# Patient Record
Sex: MALE | Race: WHITE | NOT HISPANIC OR LATINO | Employment: OTHER | URBAN - METROPOLITAN AREA
[De-identification: names, ages, dates, MRNs, and addresses within clinical notes are randomized per-mention and may not be internally consistent; named-entity substitution may affect disease eponyms.]

---

## 2023-01-01 ENCOUNTER — NURSING HOME VISIT (OUTPATIENT)
Dept: POST ACUTE CARE | Facility: EXTERNAL LOCATION | Age: 88
End: 2023-01-01
Payer: MEDICARE

## 2023-01-01 DIAGNOSIS — R33.8 ACUTE URINARY RETENTION: ICD-10-CM

## 2023-01-01 DIAGNOSIS — R27.0 ATAXIA: ICD-10-CM

## 2023-01-01 DIAGNOSIS — I95.89 OTHER SPECIFIED HYPOTENSION: Primary | ICD-10-CM

## 2023-01-01 DIAGNOSIS — I95.89 OTHER SPECIFIED HYPOTENSION: ICD-10-CM

## 2023-01-01 DIAGNOSIS — R31.9 URINARY TRACT INFECTION WITH HEMATURIA, SITE UNSPECIFIED: Primary | ICD-10-CM

## 2023-01-01 DIAGNOSIS — F03.B0 MODERATE DEMENTIA, UNSPECIFIED DEMENTIA TYPE, UNSPECIFIED WHETHER BEHAVIORAL, PSYCHOTIC, OR MOOD DISTURBANCE OR ANXIETY (MULTI): ICD-10-CM

## 2023-01-01 DIAGNOSIS — E11.40 TYPE 2 DIABETES MELLITUS WITH DIABETIC NEUROPATHY, UNSPECIFIED WHETHER LONG TERM INSULIN USE (MULTI): Primary | ICD-10-CM

## 2023-01-01 DIAGNOSIS — M81.0 OSTEOPOROSIS, UNSPECIFIED OSTEOPOROSIS TYPE, UNSPECIFIED PATHOLOGICAL FRACTURE PRESENCE: ICD-10-CM

## 2023-01-01 DIAGNOSIS — G93.41 METABOLIC ENCEPHALOPATHY: ICD-10-CM

## 2023-01-01 DIAGNOSIS — I95.9 HYPOTENSION, UNSPECIFIED HYPOTENSION TYPE: ICD-10-CM

## 2023-01-01 DIAGNOSIS — K40.30 INCARCERATED INGUINAL HERNIA: ICD-10-CM

## 2023-01-01 DIAGNOSIS — Z00.00 ENCOUNTER FOR MEDICARE ANNUAL WELLNESS EXAM: Primary | ICD-10-CM

## 2023-01-01 DIAGNOSIS — R31.9 URINARY TRACT INFECTION WITH HEMATURIA, SITE UNSPECIFIED: ICD-10-CM

## 2023-01-01 DIAGNOSIS — N39.0 URINARY TRACT INFECTION WITH HEMATURIA, SITE UNSPECIFIED: Primary | ICD-10-CM

## 2023-01-01 DIAGNOSIS — J98.4 PNEUMONITIS: ICD-10-CM

## 2023-01-01 DIAGNOSIS — E11.40 TYPE 2 DIABETES MELLITUS WITH DIABETIC NEUROPATHY, UNSPECIFIED WHETHER LONG TERM INSULIN USE (MULTI): ICD-10-CM

## 2023-01-01 DIAGNOSIS — N39.0 URINARY TRACT INFECTION WITH HEMATURIA, SITE UNSPECIFIED: ICD-10-CM

## 2023-01-01 PROCEDURE — 99348 HOME/RES VST EST LOW MDM 30: CPT | Performed by: EMERGENCY MEDICINE

## 2023-01-01 PROCEDURE — 99309 SBSQ NF CARE MODERATE MDM 30: CPT | Performed by: EMERGENCY MEDICINE

## 2023-01-01 PROCEDURE — 99305 1ST NF CARE MODERATE MDM 35: CPT | Performed by: EMERGENCY MEDICINE

## 2023-01-01 PROCEDURE — 99397 PER PM REEVAL EST PAT 65+ YR: CPT | Performed by: EMERGENCY MEDICINE

## 2023-01-01 PROCEDURE — G0439 PPPS, SUBSEQ VISIT: HCPCS | Performed by: EMERGENCY MEDICINE

## 2023-01-01 PROCEDURE — 99497 ADVNCD CARE PLAN 30 MIN: CPT | Performed by: EMERGENCY MEDICINE

## 2023-01-01 PROCEDURE — 99308 SBSQ NF CARE LOW MDM 20: CPT | Performed by: EMERGENCY MEDICINE

## 2023-01-01 ASSESSMENT — ACTIVITIES OF DAILY LIVING (ADL)
MANAGING_FINANCES: TOTAL CARE
DOING_HOUSEWORK: TOTAL CARE
TAKING_MEDICATION: TOTAL CARE
BATHING: DEPENDENT
GROCERY_SHOPPING: TOTAL CARE
DRESSING: DEPENDENT

## 2023-01-01 ASSESSMENT — PATIENT HEALTH QUESTIONNAIRE - PHQ9
1. LITTLE INTEREST OR PLEASURE IN DOING THINGS: NOT AT ALL
2. FEELING DOWN, DEPRESSED OR HOPELESS: NOT AT ALL
SUM OF ALL RESPONSES TO PHQ9 QUESTIONS 1 AND 2: 0

## 2023-03-07 NOTE — LETTER
Patient: Thierry Leger  : 1927    Encounter Date: 2023    Provider Impression     89-year-old man     ASSESSMENT AND PLAN:     bronchospasm  esophagitis  difficulty in walking  lack of coordination   GERD  dysphagia   muscle weakness   retention of urine  pneumonitis  diabetes   osteoporosis               PANTOPRAZOLE TAB 40MG            Give 1 tablet orally in the morning related to GASTRO-ESOPHAGEAL REFLUX DISEASE WITHOUT ESOPHAGITIS (K21.9)  Pharmacy        Active     2019 08:00                    2019  EMBRACE TALK SALVADOR STRIPS     Inject 1 strip intradermally one time a day related to TYPE 2 DIABETES MELLITUS (E11) unsupervised self-administration  Pharmacy        Active     2019 08:00                    2019  ACETAMINOPHEN-325MG-TABS              Give 2 tablet orally every 4 hours as needed for fever AND Give 2 tablet orally every 4 hours as needed for pain  Pharmacy        Active     2019 07:00                    2019  OYSTER SHELL 500MG TAB       Give 1 tablet orally in the morning related to AGE-RELATED OSTEOPOROSIS WITHOUT CURRENT PATHOLOGICAL FRACTURE (M81.0)  Pharmacy        Active     2019 08:00                    2019  TAMSULOSIN CAP 0.4MG           Give 1 capsule orally two times a day related to RETENTION OF URINE, UNSPECIFIED (R33.9)  Pharmacy        Active     2019 08:00                    2019  Pioglitazone HCl Tablet 30 MG      Give 1 tablet by mouth one time a day for diabetes  Pharmacy        Active     2020 08:00                  2020  Vitamin D Tablet (Cholecalciferol)                Give 5000 unit by mouth one time a day for Vitamin D deficiency  Pharmacy        Active     2020 08:00                  9/15/2020  Midodrine HCl Tablet 10 MG         Give 1 tablet by mouth three times a day for hypotension hold for SBP > 110  Pharmacy        Active     2021 20:00                  2021  Glucerna Liquid (Nutritional  Supplements)   Give 1 can by mouth one time a day for weight management  Pharmacy        Active     2022 08:00                    5/3/2022     Provide safe environment for the patient     Continue current medication regimen     OT PT and speech therapy     Bowel and bladder,skin care     Nutritional support      monitor and treat blood glucose     GI and DVT prophylaxis     PRN medications     Periodic lab work     Regular Follow up      Charting was completed using electronic voice recognition technology and may have unintended errors which may not have been completely corrected.        History of Present Illness  Assisted Living note      PATIENT NAME: Africa Guadarrama  : 1927     LOCATION: [place], [place].        Patient is unable to give any history and therefore history is obtained from the chart     No acute complaints or concerns raised by nursing        PAST HISTORY: Taken from the chart which is bronchospasm, esophagitis, difficulty in walking, lack of coordination, GERD, dysphagia, muscle weakness, retention of urine, pneumonitis, diabetes, and osteoporosis.     SOCIAL HISTORY: No alcohol, tobacco or drugs.     FAMILY HISTORY: Not contributory at this age.     MEDICATIONS: As per the cart documentation.        Review of Systems  All system review as allowed by patient condition and nursing input is negative        Active Problems  Problems    · Diabetes mellitus, type 2 (250.00) (E11.9)   · Fatigue (780.79) (R53.83)   · History of right inguinal hernia repair (V45.89) (Z98.890,Z87.19)   · Incarcerated right inguinal hernia (550.10) (K40.30)   · Postoperative examination (V67.00) (Z09)   · Right upper quadrant abdominal pain (789.01) (R10.11)     Past Medical History  Problems    · No pertinent past medical history     Surgical History  Problems    · History of Cataract Surgery   · History of Heart Surgery   heart cath pre-op   · History of Hernia Repair     Family  History  Mother    · Family history of diabetes mellitus (V18.0) (Z83.3)     Social History  Problems    · Caffeine use (V49.89) (Z78.9)   · Denied: History of Drug use   · Never a smoker   · No alcohol use   · Retired     Allergies  Medication    · No Known Drug Allergies   Recorded By: Shayy Kirby; 9/14/2017 10:56:21 AM     Physical Exam  General appearance: Alert and in no acute distress  HEENT: Normal Inspection  Neck - Normal Inspectiopn  Respiratory : No respiratory distress. Lungs are clear   Cardiovascular: heart rate normal. No gallop  Back - normal inspection  Skin inspection:Warm  Musculoskeletal : No deformities  Neuro : Limited exam. Baseline  Psychiatric : Cooperative        Results/Data  Records including but not limited to electronic medical records, relevant lab work and imaging from patient's health care facility encounter were reviewed and independently verified            Electronically Signed By: Russell Gracia MD   4/11/23  2:41 PM

## 2023-04-11 PROBLEM — E11.40 TYPE 2 DIABETES MELLITUS WITH DIABETIC NEUROPATHY (MULTI): Status: ACTIVE | Noted: 2023-01-01

## 2023-04-11 PROBLEM — K40.30 INCARCERATED INGUINAL HERNIA: Status: ACTIVE | Noted: 2023-01-01

## 2023-04-11 PROBLEM — R27.0 ATAXIA: Status: ACTIVE | Noted: 2023-01-01

## 2023-04-11 PROBLEM — M81.0 OSTEOPOROSIS: Status: ACTIVE | Noted: 2023-01-01

## 2023-04-11 PROBLEM — J98.4 PNEUMONITIS: Status: ACTIVE | Noted: 2023-01-01

## 2023-04-11 NOTE — PROGRESS NOTES
Provider Impression     89-year-old man     ASSESSMENT AND PLAN:     bronchospasm  esophagitis  difficulty in walking  lack of coordination   GERD  dysphagia   muscle weakness   retention of urine  pneumonitis  diabetes   osteoporosis               PANTOPRAZOLE TAB 40MG            Give 1 tablet orally in the morning related to GASTRO-ESOPHAGEAL REFLUX DISEASE WITHOUT ESOPHAGITIS (K21.9)  Pharmacy        Active     7/9/2019 08:00                    7/9/2019  EMBRACE TALK SALVADOR STRIPS     Inject 1 strip intradermally one time a day related to TYPE 2 DIABETES MELLITUS (E11) unsupervised self-administration  Pharmacy        Active     7/9/2019 08:00                    7/31/2019  ACETAMINOPHEN-325MG-TABS              Give 2 tablet orally every 4 hours as needed for fever AND Give 2 tablet orally every 4 hours as needed for pain  Pharmacy        Active     7/9/2019 07:00                    7/9/2019  OYSTER SHELL 500MG TAB       Give 1 tablet orally in the morning related to AGE-RELATED OSTEOPOROSIS WITHOUT CURRENT PATHOLOGICAL FRACTURE (M81.0)  Pharmacy        Active     7/9/2019 08:00                    7/9/2019  TAMSULOSIN CAP 0.4MG           Give 1 capsule orally two times a day related to RETENTION OF URINE, UNSPECIFIED (R33.9)  Pharmacy        Active     7/9/2019 08:00                    7/9/2019  Pioglitazone HCl Tablet 30 MG      Give 1 tablet by mouth one time a day for diabetes  Pharmacy        Active     6/12/2020 08:00                  6/11/2020  Vitamin D Tablet (Cholecalciferol)                Give 5000 unit by mouth one time a day for Vitamin D deficiency  Pharmacy        Active     9/16/2020 08:00                  9/15/2020  Midodrine HCl Tablet 10 MG         Give 1 tablet by mouth three times a day for hypotension hold for SBP > 110  Pharmacy        Active     11/9/2021 20:00                  12/13/2021  Glucerna Liquid (Nutritional Supplements)   Give 1 can by mouth one time a day for weight  management  Pharmacy        Active     2022 08:00                    5/3/2022     Provide safe environment for the patient     Continue current medication regimen     OT PT and speech therapy     Bowel and bladder,skin care     Nutritional support      monitor and treat blood glucose     GI and DVT prophylaxis     PRN medications     Periodic lab work     Regular Follow up      Charting was completed using electronic voice recognition technology and may have unintended errors which may not have been completely corrected.        History of Present Illness  Assisted Living note      PATIENT NAME: Africa Guadarrama  : 1927     LOCATION: [place], [place].        Patient is unable to give any history and therefore history is obtained from the chart     No acute complaints or concerns raised by nursing        PAST HISTORY: Taken from the chart which is bronchospasm, esophagitis, difficulty in walking, lack of coordination, GERD, dysphagia, muscle weakness, retention of urine, pneumonitis, diabetes, and osteoporosis.     SOCIAL HISTORY: No alcohol, tobacco or drugs.     FAMILY HISTORY: Not contributory at this age.     MEDICATIONS: As per the cart documentation.        Review of Systems  All system review as allowed by patient condition and nursing input is negative        Active Problems  Problems    · Diabetes mellitus, type 2 (250.00) (E11.9)   · Fatigue (780.79) (R53.83)   · History of right inguinal hernia repair (V45.89) (Z98.890,Z87.19)   · Incarcerated right inguinal hernia (550.10) (K40.30)   · Postoperative examination (V67.00) (Z09)   · Right upper quadrant abdominal pain (789.01) (R10.11)     Past Medical History  Problems    · No pertinent past medical history     Surgical History  Problems    · History of Cataract Surgery   · History of Heart Surgery   heart cath pre-op   · History of Hernia Repair     Family History  Mother    · Family history of diabetes mellitus (V18.0)  (Z83.3)     Social History  Problems    · Caffeine use (V49.89) (Z78.9)   · Denied: History of Drug use   · Never a smoker   · No alcohol use   · Retired     Allergies  Medication    · No Known Drug Allergies   Recorded By: Shayy Kirby; 9/14/2017 10:56:21 AM     Physical Exam  General appearance: Alert and in no acute distress  HEENT: Normal Inspection  Neck - Normal Inspectiopn  Respiratory : No respiratory distress. Lungs are clear   Cardiovascular: heart rate normal. No gallop  Back - normal inspection  Skin inspection:Warm  Musculoskeletal : No deformities  Neuro : Limited exam. Baseline  Psychiatric : Cooperative        Results/Data  Records including but not limited to electronic medical records, relevant lab work and imaging from patient's health care facility encounter were reviewed and independently verified

## 2023-06-22 NOTE — LETTER
Patient: Thierry Leger  : 1927    Encounter Date: 2023    Thierry Leger   89 y.o.  male  @location@            Assessment and Plan:  History and physical  Patient was discharged to [place] from the hospital.  Later on was transferred to [facility] for long-term care     89-year-old    Acute urinary retention  UTI  Metabolic encephalopathy  Advanced dementia  BPH  History of hypotension-on midodrine  Vitamin D deficiency      Urology has recommended that patient can be discharged with indwelling Nagel  Rocephin for UTI  Follow cultures  Continue home meds  GI and DVT prophylaxis    I discussed advanced care planning including the explanation and discussion of advanced directives. If patient does not have current up to date documents, examples and information provided on how to create both living will and power of . Patient was encouraged to work on completing these documents.  Information and advise was also provided on DO NOT RESUSCITATE and patient encouraged to consider this  Patient is not sure about DNR at this time    Source of history: Nurse, Medical personnel, Medical record, Patient.  History limitation: None.    HPI:  History and physical    Patient is unable to give any detailed history and therefore history is obtained from the chart  No acute complaints voiced by the patient or acute concerns raised by nursing    History of hospitalization-    No current outpatient medications on file.     No current facility-administered medications for this visit.   This is a 89-year-old man who I [facility]  Patient has significant dementia and is unable to give any relevant history  Patient was seen earlier last week for syncope.  He was found to urine retention UTI was discharged with antibiotics and Nagel catheter  Patient pulled his Nagel catheter at the assisted living  He was seen by urology and they wanted to check how well he does without the  catheter  Patient was not able to urinate seems more confused and weak and therefore was brought to the emergency room  In the ER he was diagnosed with acute urinary retention UTI and metabolic encephalopathy and admitted for further care      Allergies (1) Active Reaction  No Known Allergies None Documented  .Current medications: Home Meds (ST)   Home Medications (10) Active  acetaminophen 325 mg oral tablet 650 mg = 2 tab(s), PRN, ORAL, [id number]  acetaminophen 325 mg oral tablet 650 mg = 2 tab(s), PRN, ORAL, [id number]  Actos 30 mg oral tablet 30 mg = 1 tabs, ORAL, DAILY  Flomax 0.4 mg oral capsule 0.4 mg = 1 caps, ORAL, BID  Keflex 500 mg oral capsule 500 mg = 1 caps, ORAL, [id number]  lactobacillus acidophilus oral capsule 1 caps, ORAL, BID  midodrine 10 mg oral tablet 10 mg = 1 tabs, ORAL, TID  Oyster Marcos 500 mg oral tablet 1 tabs, ORAL, DAILY  Protonix 40 mg oral delayed release tablet 40 mg = 1 tabs, ORAL, DAILY  Vitamin D3 125 mcg (5000 intl units) oral tablet 125 mcg = 1 tabs, ORAL, DAILY       Active Problems (1)  At risk for falls         Physical Exam:  Vital signs as per nursing/MA documentation were reviewed  General appearance: Alert and in no acute distress  HEENT: Normal Inspection  Neck - Normal Inspection  Respiratory : No respiratory distress. Lungs are clear   Cardiovascular: heart rate normal. No gallop  Back - normal inspection  Skin inspection:Warm  Musculoskeletal : No deformities  Neuro : Limited exam. Baseline    ROS: Review of symptoms is negative except for what is mentioned in HPI    Results/Data  Records including but not limited to electronic medical records, relevant lab work and imaging from patient's health care facility encounter were reviewed and independently verified      Charting was completed using voice recognition technology and may include unintended errors.    Discussed with patient/family, RN, and NP.      Electronically Signed By: Russell Gracia MD   8/10/23  3:53 PM

## 2023-07-11 NOTE — LETTER
Patient: Thierry Leger  : 1927    Encounter Date: 2023    Thierry Leger   89 y.o.  male  @location@            Assessment and Plan:  History and physical  Patient was discharged to [facility] from the hospital.  Later on was transferred to [facility] for long-term care     89-year-old    Acute urinary retention  UTI  Metabolic encephalopathy  Advanced dementia  BPH  History of hypotension-on midodrine  Vitamin D deficiency      Urology has recommended that patient can be discharged with indwelling Nagel  Rocephin for UTI  Follow cultures  Continue home meds  GI and DVT prophylaxis    I discussed advanced care planning including the explanation and discussion of advanced directives. If patient does not have current up to date documents, examples and information provided on how to create both living will and power of . Patient was encouraged to work on completing these documents.  Information and advise was also provided on DO NOT RESUSCITATE and patient encouraged to consider this  Patient is not sure about DNR at this time    Source of history: Nurse, Medical personnel, Medical record, Patient.  History limitation: None.    HPI:  History and physical    Patient is unable to give any detailed history and therefore history is obtained from the chart  No acute complaints voiced by the patient or acute concerns raised by nursing    History of hospitalization-    No current outpatient medications on file.     No current facility-administered medications for this visit.   This is a 89-year-old man who I follow at [facility]  Patient has significant dementia and is unable to give any relevant history  Patient was seen earlier last week for syncope.  He was found to urine retention UTI was discharged with antibiotics and Nagel catheter  Patient pulled his Nagel catheter at the assisted living  He was seen by urology and they wanted to check how well he does without the  catheter  Patient was not able to urinate seems more confused and weak and therefore was brought to the emergency room  In the ER he was diagnosed with acute urinary retention UTI and metabolic encephalopathy and admitted for further care      Allergies (1) Active Reaction  No Known Allergies None Documented  .Current medications: Home Meds (ST)   Home Medications (10) Active  acetaminophen 325 mg oral tablet 650 mg = 2 tab(s), PRN, ORAL, [id number]  acetaminophen 325 mg oral tablet 650 mg = 2 tab(s), PRN, ORAL, [id number]  Actos 30 mg oral tablet 30 mg = 1 tabs, ORAL, DAILY  Flomax 0.4 mg oral capsule 0.4 mg = 1 caps, ORAL, BID  Keflex 500 mg oral capsule 500 mg = 1 caps, ORAL, [id number]  lactobacillus acidophilus oral capsule 1 caps, ORAL, BID  midodrine 10 mg oral tablet 10 mg = 1 tabs, ORAL, TID  Oyster Marcos 500 mg oral tablet 1 tabs, ORAL, DAILY  Protonix 40 mg oral delayed release tablet 40 mg = 1 tabs, ORAL, DAILY  Vitamin D3 125 mcg (5000 intl units) oral tablet 125 mcg = 1 tabs, ORAL, DAILY       Active Problems (1)  At risk for falls         Physical Exam:  Vital signs as per nursing/MA documentation were reviewed  General appearance: Alert and in no acute distress  HEENT: Normal Inspection  Neck - Normal Inspection  Respiratory : No respiratory distress. Lungs are clear   Cardiovascular: heart rate normal. No gallop  Back - normal inspection  Skin inspection:Warm  Musculoskeletal : No deformities  Neuro : Limited exam. Baseline    ROS: Review of symptoms is negative except for what is mentioned in HPI    Results/Data  Records including but not limited to electronic medical records, relevant lab work and imaging from patient's health care facility encounter were reviewed and independently verified      Charting was completed using voice recognition technology and may include unintended errors.    Discussed with patient/family, RN, and NP.      Electronically Signed By: Russell Gracia MD   8/10/23  3:55 PM

## 2023-08-03 NOTE — LETTER
Patient: Thierry Leger  : 1927    Encounter Date: 2023    Thierry Leger   89 y.o.  male  @location@            Assessment and Plan:  Patient was discharged to [place] [facility] from the hospital.  Later on was transferred to [facility] for long-term care     89-year-old    Acute urinary retention  UTI  Metabolic encephalopathy  Advanced dementia  BPH  History of hypotension-on midodrine  Vitamin D deficiency      Urology has recommended that patient can be discharged with indwelling Nagel  Rocephin for UTI  Follow cultures  Continue home meds  GI and DVT prophylaxis    -Fall prevention    -Cognitive engagement     -Monitor and treat blood pressure     -Aggressive decubitus ulcer prevention.     -Bowel and bladder care     -Optimal nutrition and supplementation as needed     -GI  and DVT prophylaxis     -Symptom control     -Ambulation as tolerated     -Will follow    Charting is done using voice recognition software and may contain errors which have not been completely corrected      Source of history: Nurse, Medical personnel, Medical record, Patient.  History limitation: None.    HPI:      Patient is unable to give any detailed history and therefore history is obtained from the chart  No acute complaints voiced by the patient or acute concerns raised by nursing    History of hospitalization-    No current outpatient medications on file.     No current facility-administered medications for this visit.   This is a 89-year-old man who I follow at [facility]  Patient has significant dementia and is unable to give any relevant history  Patient was seen earlier last week for syncope.  He was found to urine retention UTI was discharged with antibiotics and Nagel catheter  Patient pulled his Nagel catheter at the assisted living  He was seen by urology and they wanted to check how well he does without the catheter  Patient was not able to urinate seems more confused and weak and  therefore was brought to the emergency room  In the ER he was diagnosed with acute urinary retention UTI and metabolic encephalopathy and admitted for further care      Allergies (1) Active Reaction  No Known Allergies None Documented  .Current medications: Home Meds (ST)   Home Medications (10) Active  acetaminophen 325 mg oral tablet 650 mg = 2 tab(s), PRN, ORAL, [id number]  acetaminophen 325 mg oral tablet 650 mg = 2 tab(s), PRN, ORAL, [id number]  Actos 30 mg oral tablet 30 mg = 1 tabs, ORAL, DAILY  Flomax 0.4 mg oral capsule 0.4 mg = 1 caps, ORAL, BID  Keflex 500 mg oral capsule 500 mg = 1 caps, ORAL, [id number]  lactobacillus acidophilus oral capsule 1 caps, ORAL, BID  midodrine 10 mg oral tablet 10 mg = 1 tabs, ORAL, TID  Oyster Marcos 500 mg oral tablet 1 tabs, ORAL, DAILY  Protonix 40 mg oral delayed release tablet 40 mg = 1 tabs, ORAL, DAILY  Vitamin D3 125 mcg (5000 intl units) oral tablet 125 mcg = 1 tabs, ORAL, DAILY       Active Problems (1)  At risk for falls         Physical Exam:  Vital signs as per nursing/MA documentation were reviewed  General appearance: Alert and in no acute distress  HEENT: Normal Inspection  Neck - Normal Inspection  Respiratory : No respiratory distress. Lungs are clear   Cardiovascular: heart rate normal. No gallop  Back - normal inspection  Skin inspection:Warm  Musculoskeletal : No deformities  Neuro : Limited exam. Baseline    ROS: Review of symptoms is negative except for what is mentioned in HPI    Results/Data  Records including but not limited to electronic medical records, relevant lab work and imaging from patient's health care facility encounter were reviewed and independently verified      Charting was completed using voice recognition technology and may include unintended errors.    Discussed with patient/family, RN, and NP.      Electronically Signed By: Russell Gracia MD   8/10/23  4:42 PM

## 2023-08-10 PROBLEM — G93.41 METABOLIC ENCEPHALOPATHY: Status: ACTIVE | Noted: 2023-01-01

## 2023-08-10 PROBLEM — N39.0 URINARY TRACT INFECTION WITH HEMATURIA: Status: ACTIVE | Noted: 2023-01-01

## 2023-08-10 PROBLEM — F03.B0 MODERATE DEMENTIA (MULTI): Status: ACTIVE | Noted: 2023-01-01

## 2023-08-10 PROBLEM — R31.9 URINARY TRACT INFECTION WITH HEMATURIA: Status: ACTIVE | Noted: 2023-01-01

## 2023-08-10 PROBLEM — I95.9 HYPOTENSION: Status: ACTIVE | Noted: 2023-01-01

## 2023-08-10 PROBLEM — R33.8 ACUTE URINARY RETENTION: Status: ACTIVE | Noted: 2023-01-01

## 2023-08-10 NOTE — PROGRESS NOTES
Thierry Leger   89 y.o.  male  @location@            Assessment and Plan:  History and physical  Patient was discharged to [facility] from the hospital.  Later on was transferred to [facility] for long-term care     89-year-old    Acute urinary retention  UTI  Metabolic encephalopathy  Advanced dementia  BPH  History of hypotension-on midodrine  Vitamin D deficiency      Urology has recommended that patient can be discharged with indwelling Nagel  Rocephin for UTI  Follow cultures  Continue home meds  GI and DVT prophylaxis    I discussed advanced care planning including the explanation and discussion of advanced directives. If patient does not have current up to date documents, examples and information provided on how to create both living will and power of . Patient was encouraged to work on completing these documents.  Information and advise was also provided on DO NOT RESUSCITATE and patient encouraged to consider this  Patient is not sure about DNR at this time    Source of history: Nurse, Medical personnel, Medical record, Patient.  History limitation: None.    HPI:  History and physical    Patient is unable to give any detailed history and therefore history is obtained from the chart  No acute complaints voiced by the patient or acute concerns raised by nursing    History of hospitalization-    No current outpatient medications on file.     No current facility-administered medications for this visit.   This is a 89-year-old man who I follow at [facility]  Patient has significant dementia and is unable to give any relevant history  Patient was seen earlier last week for syncope.  He was found to urine retention UTI was discharged with antibiotics and Nagel catheter  Patient pulled his Nagel catheter at the assisted living  He was seen by urology and they wanted to check how well he does without the catheter  Patient was not able to urinate seems more confused and weak  and therefore was brought to the emergency room  In the ER he was diagnosed with acute urinary retention UTI and metabolic encephalopathy and admitted for further care      Allergies (1) Active Reaction  No Known Allergies None Documented  .Current medications: Home Meds (ST)   Home Medications (10) Active  acetaminophen 325 mg oral tablet 650 mg = 2 tab(s), PRN, ORAL, [id number]  acetaminophen 325 mg oral tablet 650 mg = 2 tab(s), PRN, ORAL, [id number]  Actos 30 mg oral tablet 30 mg = 1 tabs, ORAL, DAILY  Flomax 0.4 mg oral capsule 0.4 mg = 1 caps, ORAL, BID  Keflex 500 mg oral capsule 500 mg = 1 caps, ORAL, [id number]  lactobacillus acidophilus oral capsule 1 caps, ORAL, BID  midodrine 10 mg oral tablet 10 mg = 1 tabs, ORAL, TID  Oyster Marcos 500 mg oral tablet 1 tabs, ORAL, DAILY  Protonix 40 mg oral delayed release tablet 40 mg = 1 tabs, ORAL, DAILY  Vitamin D3 125 mcg (5000 intl units) oral tablet 125 mcg = 1 tabs, ORAL, DAILY       Active Problems (1)  At risk for falls         Physical Exam:  Vital signs as per nursing/MA documentation were reviewed  General appearance: Alert and in no acute distress  HEENT: Normal Inspection  Neck - Normal Inspection  Respiratory : No respiratory distress. Lungs are clear   Cardiovascular: heart rate normal. No gallop  Back - normal inspection  Skin inspection:Warm  Musculoskeletal : No deformities  Neuro : Limited exam. Baseline    ROS: Review of symptoms is negative except for what is mentioned in HPI    Results/Data  Records including but not limited to electronic medical records, relevant lab work and imaging from patient's health care facility encounter were reviewed and independently verified      Charting was completed using voice recognition technology and may include unintended errors.    Discussed with patient/family, RN, and NP.

## 2023-08-10 NOTE — PROGRESS NOTES
Thierry Leger   89 y.o.  male  @location@            Assessment and Plan:  History and physical  Patient was discharged to [facility] from the hospital.  Later on was transferred to [facility] for long-term care     89-year-old    Acute urinary retention  UTI  Metabolic encephalopathy  Advanced dementia  BPH  History of hypotension-on midodrine  Vitamin D deficiency      Urology has recommended that patient can be discharged with indwelling Nagel  Rocephin for UTI  Follow cultures  Continue home meds  GI and DVT prophylaxis    I discussed advanced care planning including the explanation and discussion of advanced directives. If patient does not have current up to date documents, examples and information provided on how to create both living will and power of . Patient was encouraged to work on completing these documents.  Information and advise was also provided on DO NOT RESUSCITATE and patient encouraged to consider this  Patient is not sure about DNR at this time    Source of history: Nurse, Medical personnel, Medical record, Patient.  History limitation: None.    HPI:  History and physical    Patient is unable to give any detailed history and therefore history is obtained from the chart  No acute complaints voiced by the patient or acute concerns raised by nursing    History of hospitalization-    No current outpatient medications on file.     No current facility-administered medications for this visit.   This is a 89-year-old man who I follow at [facility]  Patient has significant dementia and is unable to give any relevant history  Patient was seen earlier last week for syncope.  He was found to urine retention UTI was discharged with antibiotics and Nagel catheter  Patient pulled his Nagel catheter at the assisted living  He was seen by urology and they wanted to check how well he does without the catheter  Patient was not able to urinate seems more confused and weak  and therefore was brought to the emergency room  In the ER he was diagnosed with acute urinary retention UTI and metabolic encephalopathy and admitted for further care      Allergies (1) Active Reaction  No Known Allergies None Documented  .Current medications: Home Meds (ST)   Home Medications (10) Active  acetaminophen 325 mg oral tablet 650 mg = 2 tab(s), PRN, ORAL, [id number]  acetaminophen 325 mg oral tablet 650 mg = 2 tab(s), PRN, ORAL, [id number]  Actos 30 mg oral tablet 30 mg = 1 tabs, ORAL, DAILY  Flomax 0.4 mg oral capsule 0.4 mg = 1 caps, ORAL, BID  Keflex 500 mg oral capsule 500 mg = 1 caps, ORAL, [id number]  lactobacillus acidophilus oral capsule 1 caps, ORAL, BID  midodrine 10 mg oral tablet 10 mg = 1 tabs, ORAL, TID  Oyster Marcos 500 mg oral tablet 1 tabs, ORAL, DAILY  Protonix 40 mg oral delayed release tablet 40 mg = 1 tabs, ORAL, DAILY  Vitamin D3 125 mcg (5000 intl units) oral tablet 125 mcg = 1 tabs, ORAL, DAILY       Active Problems (1)  At risk for falls         Physical Exam:  Vital signs as per nursing/MA documentation were reviewed  General appearance: Alert and in no acute distress  HEENT: Normal Inspection  Neck - Normal Inspection  Respiratory : No respiratory distress. Lungs are clear   Cardiovascular: heart rate normal. No gallop  Back - normal inspection  Skin inspection:Warm  Musculoskeletal : No deformities  Neuro : Limited exam. Baseline    ROS: Review of symptoms is negative except for what is mentioned in HPI    Results/Data  Records including but not limited to electronic medical records, relevant lab work and imaging from patient's health care facility encounter were reviewed and independently verified      Charting was completed using voice recognition technology and may include unintended errors.    Discussed with patient/family, RN, and NP.

## 2023-08-10 NOTE — PROGRESS NOTES
Thierry Leger   89 y.o.  male  @location@            Assessment and Plan:  Patient was discharged to [facility] from the hospital.  Later on was transferred to [facility] for long-term care     89-year-old    Acute urinary retention  UTI  Metabolic encephalopathy  Advanced dementia  BPH  History of hypotension-on midodrine  Vitamin D deficiency      Urology has recommended that patient can be discharged with indwelling Nagel  Rocephin for UTI  Follow cultures  Continue home meds  GI and DVT prophylaxis    -Fall prevention    -Cognitive engagement     -Monitor and treat blood pressure     -Aggressive decubitus ulcer prevention.     -Bowel and bladder care     -Optimal nutrition and supplementation as needed     -GI  and DVT prophylaxis     -Symptom control     -Ambulation as tolerated     -Will follow    Charting is done using voice recognition software and may contain errors which have not been completely corrected      Source of history: Nurse, Medical personnel, Medical record, Patient.  History limitation: None.    HPI:      Patient is unable to give any detailed history and therefore history is obtained from the chart  No acute complaints voiced by the patient or acute concerns raised by nursing    History of hospitalization-    No current outpatient medications on file.     No current facility-administered medications for this visit.   This is a 89-year-old man who I follow at [facility]  Patient has significant dementia and is unable to give any relevant history  Patient was seen earlier last week for syncope.  He was found to urine retention UTI was discharged with antibiotics and Nagel catheter  Patient pulled his Nagel catheter at the assisted living  He was seen by urology and they wanted to check how well he does without the catheter  Patient was not able to urinate seems more confused and weak and therefore was brought to the emergency room  In the ER he was diagnosed  with acute urinary retention UTI and metabolic encephalopathy and admitted for further care      Allergies (1) Active Reaction  No Known Allergies None Documented  .Current medications: Home Meds (ST)   Home Medications (10) Active  acetaminophen 325 mg oral tablet 650 mg = 2 tab(s), PRN, ORAL, [id number]  acetaminophen 325 mg oral tablet 650 mg = 2 tab(s), PRN, ORAL, [id number]  Actos 30 mg oral tablet 30 mg = 1 tabs, ORAL, DAILY  Flomax 0.4 mg oral capsule 0.4 mg = 1 caps, ORAL, BID  Keflex 500 mg oral capsule 500 mg = 1 caps, ORAL, [id number]  lactobacillus acidophilus oral capsule 1 caps, ORAL, BID  midodrine 10 mg oral tablet 10 mg = 1 tabs, ORAL, TID  Oyster Marcos 500 mg oral tablet 1 tabs, ORAL, DAILY  Protonix 40 mg oral delayed release tablet 40 mg = 1 tabs, ORAL, DAILY  Vitamin D3 125 mcg (5000 intl units) oral tablet 125 mcg = 1 tabs, ORAL, DAILY       Active Problems (1)  At risk for falls         Physical Exam:  Vital signs as per nursing/MA documentation were reviewed  General appearance: Alert and in no acute distress  HEENT: Normal Inspection  Neck - Normal Inspection  Respiratory : No respiratory distress. Lungs are clear   Cardiovascular: heart rate normal. No gallop  Back - normal inspection  Skin inspection:Warm  Musculoskeletal : No deformities  Neuro : Limited exam. Baseline    ROS: Review of symptoms is negative except for what is mentioned in HPI    Results/Data  Records including but not limited to electronic medical records, relevant lab work and imaging from patient's health care facility encounter were reviewed and independently verified      Charting was completed using voice recognition technology and may include unintended errors.    Discussed with patient/family, RN, and NP.

## 2023-09-07 NOTE — LETTER
Patient: Thierry Leger  : 1927    Encounter Date: 2023    Thierry Leger   89 y.o.  male  [facility]     Assessment and Plan:  Patient was discharged to [facility] from the hospital.  Later on was transferred to [facility] for long-term care     89-year-old    Acute urinary retention  UTI  Metabolic encephalopathy  Advanced dementia  BPH  History of hypotension-on midodrine  Vitamin D deficiency      Urology has recommended that patient can be discharged with indwelling Nagel  Rocephin for UTI  Follow cultures  Continue home meds    Provide safe environment for the patient    Continue current medication regimen    OT PT and speech therapy    Bowel and bladder,skin care    Nutritional support    Monitor and treat blood glucose    GI  and DVT prophylaxis    PRN medications    Periodic lab work    Regular Follow up     PH Q-9 depression screening was completed by authorized employee of the practice and answer of the questionnaire were explained and discussed with the patient.    Face-to-face with discussion completed with this individual regarding their cardiovascular risk and behavioral therapies of nutritional choices, exercise and elimination of habits contradicting to the risk. We agreed on how they may be able to reduce their current cardiovascular risk.     Screening for alcohol use completed.     I discussed advanced care planning including the explanation and discussion of advanced directives. If patient does not have current up to date documents, examples and information provided on how to create both living will and power of . Patient was encouraged to work on completing these documents.  Information and advise was also provided on DO NOT RESUSCITATE and patient encouraged to consider this  Patient is not sure about DNR at this time.  CODE STATUS is on file with the facility    Charting is done using voice recognition software and may contain errors which  have not been completely corrected      Source of history: Nurse, Medical personnel, Medical record, Patient.  History limitation: None.    HPI:    MEDICARE WELLNESS    Patient is unable to give any detailed history and therefore history is obtained from the chart  No acute complaints voiced by the patient or acute concerns raised by nursing    History of hospitalization-    No current outpatient medications on file.     No current facility-administered medications for this visit.   This is a 89-year-old man who I follow at [facility]  Patient has significant dementia and is unable to give any relevant history  Patient was seen earlier last week for syncope.  He was found to urine retention UTI was discharged with antibiotics and Nagel catheter  Patient pulled his Nagel catheter at the assisted living  He was seen by urology and they wanted to check how well he does without the catheter  Patient was not able to urinate seems more confused and weak and therefore was brought to the emergency room  In the ER he was diagnosed with acute urinary retention UTI and metabolic encephalopathy and admitted for further care      Allergies (1) Active Reaction  No Known Allergies None Documented  .Current medications: Home Meds (ST)   Home Medications (10) Active  acetaminophen 325 mg oral tablet 650 mg = 2 tab(s), PRN, ORAL, [id number]  acetaminophen 325 mg oral tablet 650 mg = 2 tab(s), PRN, ORAL, [id number]  Actos 30 mg oral tablet 30 mg = 1 tabs, ORAL, DAILY  Flomax 0.4 mg oral capsule 0.4 mg = 1 caps, ORAL, BID  Keflex 500 mg oral capsule 500 mg = 1 caps, ORAL, [id number]  lactobacillus acidophilus oral capsule 1 caps, ORAL, BID  midodrine 10 mg oral tablet 10 mg = 1 tabs, ORAL, TID  Oyster Marcos 500 mg oral tablet 1 tabs, ORAL, DAILY  Protonix 40 mg oral delayed release tablet 40 mg = 1 tabs, ORAL, DAILY  Vitamin D3 125 mcg (5000 intl units) oral tablet 125 mcg = 1 tabs, ORAL, DAILY       Active Problems (1)  At risk  for falls         Physical Exam:  Vital signs as per nursing/MA documentation were reviewed  General appearance: Alert and in no acute distress  HEENT: Normal Inspection  Neck - Normal Inspection  Respiratory : No respiratory distress. Lungs are clear   Cardiovascular: heart rate normal. No gallop  Back - normal inspection  Skin inspection:Warm  Musculoskeletal : No deformities  Neuro : Limited exam. Baseline    ROS: Review of symptoms is negative except for what is mentioned in HPI    Results/Data  Records including but not limited to electronic medical records, relevant lab work and imaging from patient's health care facility encounter were reviewed and independently verified      Charting was completed using voice recognition technology and may include unintended errors.    Discussed with patient/family, RN, and NP.      Electronically Signed By: Russell Gracia MD   9/26/23  5:45 PM

## 2023-09-18 NOTE — PROGRESS NOTES
Thierry eLger   89 y.o.  male  [facility]     Assessment and Plan:  Patient was discharged to [facility] from the hospital.  Later on was transferred to [facility] for long-term care     89-year-old    Acute urinary retention  UTI  Metabolic encephalopathy  Advanced dementia  BPH  History of hypotension-on midodrine  Vitamin D deficiency      Urology has recommended that patient can be discharged with indwelling Nagel  Rocephin for UTI  Follow cultures  Continue home meds    Provide safe environment for the patient    Continue current medication regimen    OT PT and speech therapy    Bowel and bladder,skin care    Nutritional support    Monitor and treat blood glucose    GI  and DVT prophylaxis    PRN medications    Periodic lab work    Regular Follow up     PH Q-9 depression screening was completed by authorized employee of the practice and answer of the questionnaire were explained and discussed with the patient.    Face-to-face with discussion completed with this individual regarding their cardiovascular risk and behavioral therapies of nutritional choices, exercise and elimination of habits contradicting to the risk. We agreed on how they may be able to reduce their current cardiovascular risk.     Screening for alcohol use completed.     I discussed advanced care planning including the explanation and discussion of advanced directives. If patient does not have current up to date documents, examples and information provided on how to create both living will and power of . Patient was encouraged to work on completing these documents.  Information and advise was also provided on DO NOT RESUSCITATE and patient encouraged to consider this  Patient is not sure about DNR at this time.  CODE STATUS is on file with the facility    Charting is done using voice recognition software and may contain errors which have not been completely corrected      Source of history: Nurse,  Medical personnel, Medical record, Patient.  History limitation: None.    HPI:    MEDICARE WELLNESS    Patient is unable to give any detailed history and therefore history is obtained from the chart  No acute complaints voiced by the patient or acute concerns raised by nursing    History of hospitalization-    No current outpatient medications on file.     No current facility-administered medications for this visit.   This is a 89-year-old man who I follow at [facility]  Patient has significant dementia and is unable to give any relevant history  Patient was seen earlier last week for syncope.  He was found to urine retention UTI was discharged with antibiotics and Nagel catheter  Patient pulled his Nagel catheter at the assisted living  He was seen by urology and they wanted to check how well he does without the catheter  Patient was not able to urinate seems more confused and weak and therefore was brought to the emergency room  In the ER he was diagnosed with acute urinary retention UTI and metabolic encephalopathy and admitted for further care      Allergies (1) Active Reaction  No Known Allergies None Documented  .Current medications: Home Meds (ST)   Home Medications (10) Active  acetaminophen 325 mg oral tablet 650 mg = 2 tab(s), PRN, ORAL, [id number]  acetaminophen 325 mg oral tablet 650 mg = 2 tab(s), PRN, ORAL, [id number]  Actos 30 mg oral tablet 30 mg = 1 tabs, ORAL, DAILY  Flomax 0.4 mg oral capsule 0.4 mg = 1 caps, ORAL, BID  Keflex 500 mg oral capsule 500 mg = 1 caps, ORAL, [id number]  lactobacillus acidophilus oral capsule 1 caps, ORAL, BID  midodrine 10 mg oral tablet 10 mg = 1 tabs, ORAL, TID  Oyster Marcos 500 mg oral tablet 1 tabs, ORAL, DAILY  Protonix 40 mg oral delayed release tablet 40 mg = 1 tabs, ORAL, DAILY  Vitamin D3 125 mcg (5000 intl units) oral tablet 125 mcg = 1 tabs, ORAL, DAILY       Active Problems (1)  At risk for falls         Physical Exam:  Vital signs as per nursing/MA  documentation were reviewed  General appearance: Alert and in no acute distress  HEENT: Normal Inspection  Neck - Normal Inspection  Respiratory : No respiratory distress. Lungs are clear   Cardiovascular: heart rate normal. No gallop  Back - normal inspection  Skin inspection:Warm  Musculoskeletal : No deformities  Neuro : Limited exam. Baseline    ROS: Review of symptoms is negative except for what is mentioned in HPI    Results/Data  Records including but not limited to electronic medical records, relevant lab work and imaging from patient's health care facility encounter were reviewed and independently verified      Charting was completed using voice recognition technology and may include unintended errors.    Discussed with patient/family, RN, and NP.

## 2023-10-05 NOTE — LETTER
Patient: Thierry Leger  : 1927    Encounter Date: 10/05/2023    Thierry Leger   89 y.o.  male  @location@            Assessment and Plan:  Patient was discharged to [facility] from the hospital.  Later on was transferred to [facility] for long-term care     89-year-old    Acute urinary retention  UTI  Metabolic encephalopathy  Advanced dementia  BPH  History of hypotension-on midodrine  Vitamin D deficiency      Urology has recommended that patient can be discharged with indwelling Nagel  Rocephin for UTI  Follow cultures  Continue home meds  GI and DVT prophylaxis    Provide safe environment for the patient     Continue current medication regimen     OT PT and speech therapy     Bowel and bladder,skin care     Nutritional support     monitor and treat blood glucose     GI  and DVT prophylaxis     PRN medications     Periodic lab work    Regular Follow up    Charting is done using voice recognition software and may contain errors which have not been completely corrected        Source of history: Nurse, Medical personnel, Medical record, Patient.  History limitation: None.    HPI:      Patient is unable to give any detailed history and therefore history is obtained from the chart  No acute complaints voiced by the patient or acute concerns raised by nursing    History of hospitalization-    No current outpatient medications on file.     No current facility-administered medications for this visit.   This is a 89-year-old man who I follow at [facility]  Patient has significant dementia and is unable to give any relevant history  Patient was seen earlier last week for syncope.  He was found to urine retention UTI was discharged with antibiotics and Nagel catheter  Patient pulled his Nagel catheter at the assisted living  He was seen by urology and they wanted to check how well he does without the catheter  Patient was not able to urinate seems more confused and weak and therefore  was brought to the emergency room  In the ER he was diagnosed with acute urinary retention UTI and metabolic encephalopathy and admitted for further care      Allergies (1) Active Reaction  No Known Allergies None Documented  .Current medications: Home Meds (ST)   Home Medications (10) Active  acetaminophen 325 mg oral tablet 650 mg = 2 tab(s), PRN, ORAL, [id number]  acetaminophen 325 mg oral tablet 650 mg = 2 tab(s), PRN, ORAL, [id number]  Actos 30 mg oral tablet 30 mg = 1 tabs, ORAL, DAILY  Flomax 0.4 mg oral capsule 0.4 mg = 1 caps, ORAL, BID  Keflex 500 mg oral capsule 500 mg = 1 caps, ORAL, [id number]  lactobacillus acidophilus oral capsule 1 caps, ORAL, BID  midodrine 10 mg oral tablet 10 mg = 1 tabs, ORAL, TID  Oyster Marcos 500 mg oral tablet 1 tabs, ORAL, DAILY  Protonix 40 mg oral delayed release tablet 40 mg = 1 tabs, ORAL, DAILY  Vitamin D3 125 mcg (5000 intl units) oral tablet 125 mcg = 1 tabs, ORAL, DAILY       Active Problems (1)  At risk for falls         Physical Exam:  Vital signs as per nursing/MA documentation were reviewed  General appearance: Alert and in no acute distress  HEENT: Normal Inspection  Neck - Normal Inspection  Respiratory : No respiratory distress. Lungs are clear   Cardiovascular: heart rate normal. No gallop  Back - normal inspection  Skin inspection:Warm  Musculoskeletal : No deformities  Neuro : Limited exam. Baseline    ROS: Review of symptoms is negative except for what is mentioned in HPI    Results/Data  Records including but not limited to electronic medical records, relevant lab work and imaging from patient's health care facility encounter were reviewed and independently verified      Charting was completed using voice recognition technology and may include unintended errors.    Discussed with patient/family, RN, and NP.      Electronically Signed By: Russell Gracia MD   10/18/23  7:27 PM

## 2023-10-17 NOTE — PROGRESS NOTES
Thierry Leger   89 y.o.  male  @location@            Assessment and Plan:  Patient was discharged to [facility] from the hospital.  Later on was transferred to [facility] for long-term care     89-year-old    Acute urinary retention  UTI  Metabolic encephalopathy  Advanced dementia  BPH  History of hypotension-on midodrine  Vitamin D deficiency      Urology has recommended that patient can be discharged with indwelling Nagel  Rocephin for UTI  Follow cultures  Continue home meds  GI and DVT prophylaxis    Provide safe environment for the patient     Continue current medication regimen     OT PT and speech therapy     Bowel and bladder,skin care     Nutritional support     monitor and treat blood glucose     GI  and DVT prophylaxis     PRN medications     Periodic lab work    Regular Follow up    Charting is done using voice recognition software and may contain errors which have not been completely corrected        Source of history: Nurse, Medical personnel, Medical record, Patient.  History limitation: None.    HPI:      Patient is unable to give any detailed history and therefore history is obtained from the chart  No acute complaints voiced by the patient or acute concerns raised by nursing    History of hospitalization-    No current outpatient medications on file.     No current facility-administered medications for this visit.   This is a 89-year-old man who I follow at [facility]  Patient has significant dementia and is unable to give any relevant history  Patient was seen earlier last week for syncope.  He was found to urine retention UTI was discharged with antibiotics and Nagel catheter  Patient pulled his Nagel catheter at the assisted living  He was seen by urology and they wanted to check how well he does without the catheter  Patient was not able to urinate seems more confused and weak and therefore was brought to the emergency room  In the ER he was diagnosed with  acute urinary retention UTI and metabolic encephalopathy and admitted for further care      Allergies (1) Active Reaction  No Known Allergies None Documented  .Current medications: Home Meds (ST)   Home Medications (10) Active  acetaminophen 325 mg oral tablet 650 mg = 2 tab(s), PRN, ORAL, [id number]  acetaminophen 325 mg oral tablet 650 mg = 2 tab(s), PRN, ORAL, [id number]  Actos 30 mg oral tablet 30 mg = 1 tabs, ORAL, DAILY  Flomax 0.4 mg oral capsule 0.4 mg = 1 caps, ORAL, BID  Keflex 500 mg oral capsule 500 mg = 1 caps, ORAL, [id number]  lactobacillus acidophilus oral capsule 1 caps, ORAL, BID  midodrine 10 mg oral tablet 10 mg = 1 tabs, ORAL, TID  Oyster Marcos 500 mg oral tablet 1 tabs, ORAL, DAILY  Protonix 40 mg oral delayed release tablet 40 mg = 1 tabs, ORAL, DAILY  Vitamin D3 125 mcg (5000 intl units) oral tablet 125 mcg = 1 tabs, ORAL, DAILY       Active Problems (1)  At risk for falls         Physical Exam:  Vital signs as per nursing/MA documentation were reviewed  General appearance: Alert and in no acute distress  HEENT: Normal Inspection  Neck - Normal Inspection  Respiratory : No respiratory distress. Lungs are clear   Cardiovascular: heart rate normal. No gallop  Back - normal inspection  Skin inspection:Warm  Musculoskeletal : No deformities  Neuro : Limited exam. Baseline    ROS: Review of symptoms is negative except for what is mentioned in HPI    Results/Data  Records including but not limited to electronic medical records, relevant lab work and imaging from patient's health care facility encounter were reviewed and independently verified      Charting was completed using voice recognition technology and may include unintended errors.    Discussed with patient/family, RN, and NP.

## 2023-11-02 NOTE — LETTER
Patient: Thierry Leger  : 1927    Encounter Date: 2023    Thierry Leger   89 y.o.  male  @location@            Assessment and Plan:  Patient was discharged to [place] [facility] from the hospital.  Later on was transferred to [facility] for long-term care     89-year-old    Acute urinary retention  UTI  Metabolic encephalopathy  Advanced dementia  BPH  History of hypotension-on midodrine  Vitamin D deficiency      Urology has recommended that patient can be discharged with indwelling Nagel  Rocephin for UTI  Follow cultures  Continue home meds  GI and DVT prophylaxis    Rx list reviewed.   PT and OT evaluation is in the process.   Routine safety measures, fall precautions, risk modification and alarm placement if needed for prevention of falls.   Skin care precautions, prevention of pressures sores at pressure points assessed.   Pt needs to be monitored frequently by nursing staff particularly at night time.   Any confusion, agitation or behavioural disturbance needs to be attended, as per home policy   rapid covid Ag assay need to be done, notify if positive.   If needed appropriate measures to be taken for alarm placements and assisted devices, pt was told not to get up and ambulate at night unless help and assist available at bedside,   labs will be done as per our routine protocol.   PO intake need to be monitored if consuming po.       Charting is done using voice recognition software and may contain errors which have not been completely corrected        Source of history: Nurse, Medical personnel, Medical record, Patient.  History limitation: None.    HPI:      Patient is unable to give any detailed history and therefore history is obtained from the chart  No acute complaints voiced by the patient or acute concerns raised by nursing    History of hospitalization-    No current outpatient medications on file.     No current facility-administered medications for this visit.   This is a  89-year-old man who I follow at [facility]  Patient has significant dementia and is unable to give any relevant history  Patient was seen earlier last week for syncope.  He was found to urine retention UTI was discharged with antibiotics and Nagel catheter  Patient pulled his Nagel catheter at the assisted living  He was seen by urology and they wanted to check how well he does without the catheter  Patient was not able to urinate seems more confused and weak and therefore was brought to the emergency room  In the ER he was diagnosed with acute urinary retention UTI and metabolic encephalopathy and admitted for further care      Allergies (1) Active Reaction  No Known Allergies None Documented  .Current medications: Home Meds (ST)   Home Medications (10) Active  acetaminophen 325 mg oral tablet 650 mg = 2 tab(s), PRN, ORAL, [id number]  acetaminophen 325 mg oral tablet 650 mg = 2 tab(s), PRN, ORAL, [id number]  Actos 30 mg oral tablet 30 mg = 1 tabs, ORAL, DAILY  Flomax 0.4 mg oral capsule 0.4 mg = 1 caps, ORAL, BID  Keflex 500 mg oral capsule 500 mg = 1 caps, ORAL, [id number]  lactobacillus acidophilus oral capsule 1 caps, ORAL, BID  midodrine 10 mg oral tablet 10 mg = 1 tabs, ORAL, TID  Oyster Marcos 500 mg oral tablet 1 tabs, ORAL, DAILY  Protonix 40 mg oral delayed release tablet 40 mg = 1 tabs, ORAL, DAILY  Vitamin D3 125 mcg (5000 intl units) oral tablet 125 mcg = 1 tabs, ORAL, DAILY       Active Problems (1)  At risk for falls         Physical Exam:  Vital signs as per nursing/MA documentation were reviewed  General appearance: Alert and in no acute distress  HEENT: Normal Inspection  Neck - Normal Inspection  Respiratory : No respiratory distress. Lungs are clear   Cardiovascular: heart rate normal. No gallop  Back - normal inspection  Skin inspection:Warm  Musculoskeletal : No deformities  Neuro : Limited exam. Baseline    ROS: Review of symptoms is negative except for what is mentioned in  HPI    Results/Data  Records including but not limited to electronic medical records, relevant lab work and imaging from patient's health care facility encounter were reviewed and independently verified      Charting was completed using voice recognition technology and may include unintended errors.    Discussed with patient/family, RN, and NP.      Electronically Signed By: Russell Gracia MD   11/9/23  3:59 PM

## 2023-11-09 NOTE — PROGRESS NOTES
Thierry Leger   89 y.o.  male  @location@            Assessment and Plan:  Patient was discharged to [facility] from the hospital.  Later on was transferred to [facility] for long-term care     89-year-old    Acute urinary retention  UTI  Metabolic encephalopathy  Advanced dementia  BPH  History of hypotension-on midodrine  Vitamin D deficiency      Urology has recommended that patient can be discharged with indwelling Nagel  Rocephin for UTI  Follow cultures  Continue home meds  GI and DVT prophylaxis    Rx list reviewed.   PT and OT evaluation is in the process.   Routine safety measures, fall precautions, risk modification and alarm placement if needed for prevention of falls.   Skin care precautions, prevention of pressures sores at pressure points assessed.   Pt needs to be monitored frequently by nursing staff particularly at night time.   Any confusion, agitation or behavioural disturbance needs to be attended, as per home policy   rapid covid Ag assay need to be done, notify if positive.   If needed appropriate measures to be taken for alarm placements and assisted devices, pt was told not to get up and ambulate at night unless help and assist available at bedside,   labs will be done as per our routine protocol.   PO intake need to be monitored if consuming po.       Charting is done using voice recognition software and may contain errors which have not been completely corrected        Source of history: Nurse, Medical personnel, Medical record, Patient.  History limitation: None.    HPI:      Patient is unable to give any detailed history and therefore history is obtained from the chart  No acute complaints voiced by the patient or acute concerns raised by nursing    History of hospitalization-    No current outpatient medications on file.     No current facility-administered medications for this visit.   This is a 89-year-old man who I follow at [facility]  Patient has  significant dementia and is unable to give any relevant history  Patient was seen earlier last week for syncope.  He was found to urine retention UTI was discharged with antibiotics and Nagel catheter  Patient pulled his Nagel catheter at the assisted living  He was seen by urology and they wanted to check how well he does without the catheter  Patient was not able to urinate seems more confused and weak and therefore was brought to the emergency room  In the ER he was diagnosed with acute urinary retention UTI and metabolic encephalopathy and admitted for further care      Allergies (1) Active Reaction  No Known Allergies None Documented  .Current medications: Home Meds (ST)   Home Medications (10) Active  acetaminophen 325 mg oral tablet 650 mg = 2 tab(s), PRN, ORAL, [id number]  acetaminophen 325 mg oral tablet 650 mg = 2 tab(s), PRN, ORAL, [id number]  Actos 30 mg oral tablet 30 mg = 1 tabs, ORAL, DAILY  Flomax 0.4 mg oral capsule 0.4 mg = 1 caps, ORAL, BID  Keflex 500 mg oral capsule 500 mg = 1 caps, ORAL, [id number]  lactobacillus acidophilus oral capsule 1 caps, ORAL, BID  midodrine 10 mg oral tablet 10 mg = 1 tabs, ORAL, TID  Oyster Marcos 500 mg oral tablet 1 tabs, ORAL, DAILY  Protonix 40 mg oral delayed release tablet 40 mg = 1 tabs, ORAL, DAILY  Vitamin D3 125 mcg (5000 intl units) oral tablet 125 mcg = 1 tabs, ORAL, DAILY       Active Problems (1)  At risk for falls         Physical Exam:  Vital signs as per nursing/MA documentation were reviewed  General appearance: Alert and in no acute distress  HEENT: Normal Inspection  Neck - Normal Inspection  Respiratory : No respiratory distress. Lungs are clear   Cardiovascular: heart rate normal. No gallop  Back - normal inspection  Skin inspection:Warm  Musculoskeletal : No deformities  Neuro : Limited exam. Baseline    ROS: Review of symptoms is negative except for what is mentioned in HPI    Results/Data  Records including but not limited to electronic medical records,  relevant lab work and imaging from patient's health care facility encounter were reviewed and independently verified      Charting was completed using voice recognition technology and may include unintended errors.    Discussed with patient/family, RN, and NP.